# Patient Record
Sex: FEMALE | Race: WHITE
[De-identification: names, ages, dates, MRNs, and addresses within clinical notes are randomized per-mention and may not be internally consistent; named-entity substitution may affect disease eponyms.]

---

## 2020-10-12 ENCOUNTER — HOSPITAL ENCOUNTER (EMERGENCY)
Dept: HOSPITAL 1 - ED | Age: 20
Discharge: HOME | End: 2020-10-12
Payer: MEDICAID

## 2020-10-12 VITALS
HEIGHT: 58 IN | BODY MASS INDEX: 18.05 KG/M2 | BODY MASS INDEX: 18.05 KG/M2 | WEIGHT: 86 LBS | WEIGHT: 86 LBS | HEIGHT: 58 IN

## 2020-10-12 VITALS — SYSTOLIC BLOOD PRESSURE: 133 MMHG | DIASTOLIC BLOOD PRESSURE: 65 MMHG

## 2020-10-12 DIAGNOSIS — Z99.2: ICD-10-CM

## 2020-10-12 DIAGNOSIS — Z90.89: ICD-10-CM

## 2020-10-12 DIAGNOSIS — R51.9: ICD-10-CM

## 2020-10-12 DIAGNOSIS — N18.6: ICD-10-CM

## 2020-10-12 DIAGNOSIS — I12.0: ICD-10-CM

## 2020-10-12 DIAGNOSIS — R53.1: Primary | ICD-10-CM

## 2020-10-12 DIAGNOSIS — R42: ICD-10-CM

## 2020-10-12 LAB
ALBUMIN SERPL-MCNC: 3.4 G/DL (ref 3.4–5)
ALP SERPL-CCNC: 360 U/L (ref 46–116)
ALT SERPL-CCNC: 18 U/L (ref 14–59)
AST SERPL-CCNC: 21 U/L (ref 15–37)
BASOPHILS NFR BLD: 0.4 % (ref 0–2)
BILIRUB SERPL-MCNC: 0.5 MG/DL (ref 0.2–1)
BUN SERPL-MCNC: 40 MG/DL (ref 7–18)
CALCIUM SERPL-MCNC: 7.7 MG/DL (ref 8.5–10.1)
CHLORIDE SERPL-SCNC: 97 MMOL/L (ref 98–107)
CO2 SERPL-SCNC: 28.1 MMOL/L (ref 21–32)
CREAT SERPL-MCNC: 5.7 MG/DL (ref 0.6–1)
ERYTHROCYTE [DISTWIDTH] IN BLOOD BY AUTOMATED COUNT: 17.5 % (ref 11.5–14.5)
GFR SERPLBLD BASED ON 1.73 SQ M-ARVRAT: 10 ML/MIN
GLUCOSE SERPL-MCNC: 118 MG/DL (ref 74–106)
PLATELET # BLD: 262 X10^3MCL (ref 130–400)
POTASSIUM SERPL-SCNC: 4 MMOL/L (ref 3.5–5.1)
PROT SERPL-MCNC: 7.5 G/DL (ref 6.4–8.2)
SODIUM SERPL-SCNC: 135 MMOL/L (ref 136–145)

## 2020-12-07 ENCOUNTER — HOSPITAL ENCOUNTER (EMERGENCY)
Dept: HOSPITAL 26 - MED | Age: 20
Discharge: HOME | End: 2020-12-07
Payer: MEDICAID

## 2020-12-07 VITALS — SYSTOLIC BLOOD PRESSURE: 133 MMHG | DIASTOLIC BLOOD PRESSURE: 98 MMHG

## 2020-12-07 VITALS — WEIGHT: 90.38 LBS | HEIGHT: 55 IN | BODY MASS INDEX: 20.92 KG/M2

## 2020-12-07 VITALS — SYSTOLIC BLOOD PRESSURE: 138 MMHG | DIASTOLIC BLOOD PRESSURE: 86 MMHG

## 2020-12-07 DIAGNOSIS — I82.402: Primary | ICD-10-CM

## 2020-12-07 LAB
ALBUMIN FLD-MCNC: 2.5 G/DL (ref 3.4–5)
ANION GAP SERPL CALCULATED.3IONS-SCNC: 9.7 MMOL/L (ref 8–16)
AST SERPL-CCNC: 14 U/L (ref 15–37)
BASOPHILS # BLD AUTO: 0 K/UL (ref 0–0.22)
BASOPHILS NFR BLD AUTO: 0.8 % (ref 0–2)
BILIRUB SERPL-MCNC: 0.3 MG/DL (ref 0–1)
BUN SERPL-MCNC: 17 MG/DL (ref 7–18)
CHLORIDE SERPL-SCNC: 101 MMOL/L (ref 98–107)
CO2 SERPL-SCNC: 32.9 MMOL/L (ref 21–32)
CREAT SERPL-MCNC: 4.4 MG/DL (ref 0.6–1.3)
EOSINOPHIL # BLD AUTO: 0.5 K/UL (ref 0–0.4)
EOSINOPHIL NFR BLD AUTO: 8.2 % (ref 0–4)
ERYTHROCYTE [DISTWIDTH] IN BLOOD BY AUTOMATED COUNT: 19 % (ref 11.6–13.7)
GFR SERPL CREATININE-BSD FRML MDRD: 16 ML/MIN (ref 90–?)
GLUCOSE SERPL-MCNC: 112 MG/DL (ref 74–106)
HCT VFR BLD AUTO: 26.7 % (ref 36–48)
HGB BLD-MCNC: 8.9 G/DL (ref 12–16)
LYMPHOCYTES # BLD AUTO: 1.1 K/UL (ref 2.5–16.5)
LYMPHOCYTES NFR BLD AUTO: 18.7 % (ref 20.5–51.1)
MCH RBC QN AUTO: 30 PG (ref 27–31)
MCHC RBC AUTO-ENTMCNC: 33 G/DL (ref 33–37)
MCV RBC AUTO: 91.1 FL (ref 80–94)
MONOCYTES # BLD AUTO: 0.5 K/UL (ref 0.8–1)
MONOCYTES NFR BLD AUTO: 8.9 % (ref 1.7–9.3)
NEUTROPHILS # BLD AUTO: 3.7 K/UL (ref 1.8–7.7)
NEUTROPHILS NFR BLD AUTO: 63.4 % (ref 42.2–75.2)
PLATELET # BLD AUTO: 327 K/UL (ref 140–450)
POTASSIUM SERPL-SCNC: 3.6 MMOL/L (ref 3.5–5.1)
RBC # BLD AUTO: 2.93 MIL/UL (ref 4.2–5.4)
SODIUM SERPL-SCNC: 140 MMOL/L (ref 136–145)
WBC # BLD AUTO: 5.8 K/UL (ref 4.8–10.8)

## 2020-12-07 NOTE — NUR
Patient discharged with v/s stable. Written and verbal after care instructions 
given and explained to mother. 

Minesh's mother verbalized understanding. Ambulatory with steady gait. All 
questions addressed prior to discharge. Advised to follow up with PMD.

## 2020-12-07 NOTE — NUR
-------------------------------------------------------------------------------

            *** Note angela in EDM - 12/07/20 at 1750 by MORGAN ***             

-------------------------------------------------------------------------------

Patient discharged with v/s stable. Written and verbal after care instructions 
given and explained. 

Patient verbalized understanding. Ambulatory with steady gait. All questions 
addressed prior to discharge. Advised to follow up with PMD.

## 2020-12-07 NOTE — NUR
BIB MOTHER C/O FEET, LOWER LEGS SWELLING S/P HEMODIALYSIS X TODAY.  FINISHED 
DIALYSIS TODAY. PT HAD OVARIAN SURGERY 3 DAYS. COVID TESTED NEGATIVE.



MED HX: ESRD WITH HEMODIALYSIS (M/W/F)

## 2020-12-07 NOTE — NUR
-------------------------------------------------------------------------------

            *** Note angela in EDM - 12/07/20 at 1633 by MEDJONNY ***             

-------------------------------------------------------------------------------

BIB MOTHER C/O FEET, LOWER LEGS SWELLING S/P HEMODIALYSIS X TODAY.  FINISHED 
DIALYSIS TODAY. PT HAD OVARIAN SURGERY 3 DAYS. COVID TESTED NEGATIVE.



MED HX: ESRD WITH HEMODIALYSIS (M/W/F)

## 2020-12-19 ENCOUNTER — HOSPITAL ENCOUNTER (EMERGENCY)
Dept: HOSPITAL 26 - MED | Age: 20
Discharge: HOME | End: 2020-12-19
Payer: MEDICAID

## 2020-12-19 VITALS — HEIGHT: 58 IN | WEIGHT: 98 LBS | BODY MASS INDEX: 20.57 KG/M2

## 2020-12-19 VITALS — DIASTOLIC BLOOD PRESSURE: 79 MMHG | SYSTOLIC BLOOD PRESSURE: 127 MMHG

## 2020-12-19 DIAGNOSIS — I82.412: Primary | ICD-10-CM

## 2020-12-19 DIAGNOSIS — Z88.0: ICD-10-CM

## 2020-12-19 DIAGNOSIS — Z87.448: ICD-10-CM

## 2020-12-19 LAB
ALBUMIN FLD-MCNC: 3.2 G/DL (ref 3.4–5)
ANION GAP SERPL CALCULATED.3IONS-SCNC: 13.9 MMOL/L (ref 8–16)
AST SERPL-CCNC: 15 U/L (ref 15–37)
BASOPHILS # BLD AUTO: 0.1 K/UL (ref 0–0.22)
BASOPHILS NFR BLD AUTO: 1.2 % (ref 0–2)
BILIRUB SERPL-MCNC: 0.3 MG/DL (ref 0–1)
BUN SERPL-MCNC: 51 MG/DL (ref 7–18)
CHLORIDE SERPL-SCNC: 101 MMOL/L (ref 98–107)
CO2 SERPL-SCNC: 31.3 MMOL/L (ref 21–32)
CREAT SERPL-MCNC: 6.5 MG/DL (ref 0.6–1.3)
EOSINOPHIL # BLD AUTO: 0.7 K/UL (ref 0–0.4)
EOSINOPHIL NFR BLD AUTO: 9.9 % (ref 0–4)
ERYTHROCYTE [DISTWIDTH] IN BLOOD BY AUTOMATED COUNT: 17.7 % (ref 11.6–13.7)
GFR SERPL CREATININE-BSD FRML MDRD: 10 ML/MIN (ref 90–?)
GLUCOSE SERPL-MCNC: 101 MG/DL (ref 74–106)
HCT VFR BLD AUTO: 29.6 % (ref 36–48)
HGB BLD-MCNC: 9.8 G/DL (ref 12–16)
LYMPHOCYTES # BLD AUTO: 1.3 K/UL (ref 2.5–16.5)
LYMPHOCYTES NFR BLD AUTO: 19.6 % (ref 20.5–51.1)
MCH RBC QN AUTO: 31 PG (ref 27–31)
MCHC RBC AUTO-ENTMCNC: 33 G/DL (ref 33–37)
MCV RBC AUTO: 92.9 FL (ref 80–94)
MONOCYTES # BLD AUTO: 0.7 K/UL (ref 0.8–1)
MONOCYTES NFR BLD AUTO: 10.6 % (ref 1.7–9.3)
NEUTROPHILS # BLD AUTO: 4 K/UL (ref 1.8–7.7)
NEUTROPHILS NFR BLD AUTO: 58.7 % (ref 42.2–75.2)
PLATELET # BLD AUTO: 213 K/UL (ref 140–450)
POTASSIUM SERPL-SCNC: 5.2 MMOL/L (ref 3.5–5.1)
PROTHROMBIN TIME: 11.3 SECS (ref 10.8–13.4)
RBC # BLD AUTO: 3.19 MIL/UL (ref 4.2–5.4)
SODIUM SERPL-SCNC: 141 MMOL/L (ref 136–145)
WBC # BLD AUTO: 6.8 K/UL (ref 4.8–10.8)

## 2020-12-19 PROCEDURE — 99285 EMERGENCY DEPT VISIT HI MDM: CPT

## 2020-12-19 PROCEDURE — 80053 COMPREHEN METABOLIC PANEL: CPT

## 2020-12-19 PROCEDURE — 73590 X-RAY EXAM OF LOWER LEG: CPT

## 2020-12-19 PROCEDURE — 85610 PROTHROMBIN TIME: CPT

## 2020-12-19 PROCEDURE — 93971 EXTREMITY STUDY: CPT

## 2020-12-19 PROCEDURE — 36415 COLL VENOUS BLD VENIPUNCTURE: CPT

## 2020-12-19 PROCEDURE — 85025 COMPLETE CBC W/AUTO DIFF WBC: CPT
